# Patient Record
(demographics unavailable — no encounter records)

---

## 2021-06-21 NOTE — EDM.PDOC
ED HPI GENERAL MEDICAL PROBLEM





- General


Chief Complaint: Fever


Stated Complaint: chills


Time Seen by Provider: 06/21/21 19:23


Source of Information: Reports: Patient


History Limitations: Reports: No Limitations





- History of Present Illness


INITIAL COMMENTS - FREE TEXT/NARRATIVE: 





47-year-old male presents the emergency department with 4-day history of fever, 

body aches, joint aches, headache and nausea.  Patient states he recently 

returned from visiting [facility] from May 17 through June 4.  He did not have any 

vaccinations prior to travel.  States that about 4 days ago he started having 

chills and body aches which has progressed into a severe headache and nausea and

mild sore throat.  He denies any cough or shortness of breath.  He denies any 

upper respiratory type symptoms.  He states his appetite has been "on and off ".

 He is still taking p.o. fluids appropriately.  He still can taste and smell his

food.  He states he had his Covid vaccination in February.  He had been [place] 

vaccine.  The patient is otherwise healthy.  He does not take any prescription 

medications and he does not have a primary care provider.  Of note, the patient 

has had malaria approximately 15 years ago and he states that his symptoms that 

he is having at this time feel quite similar to when he had malaria in the past.





- Related Data


                                    Allergies











Allergy/AdvReac Type Severity Reaction Status Date / Time


 


No Known Allergies Allergy   Verified 06/21/21 19:27











Home Meds: 


                                    Home Meds





. [No Known Home Meds]  06/21/21 [History]











Social & Family History





- Tobacco Use


Tobacco Use Status *Q: Never Tobacco User


Second Hand Smoke Exposure: No





- Recreational Drug Use


Recreational Drug Use: No





ED ROS GENERAL





- Review of Systems


Review Of Systems: See Below


Constitutional: Reports: Fever, Chills, Malaise, Decreased Appetite


HEENT: Reports: Throat Pain


Respiratory: Reports: No Symptoms


Cardiovascular: Reports: No Symptoms


Endocrine: Reports: No Symptoms


GI/Abdominal: Reports: Decreased Appetite, Nausea.  Denies: Abdominal Pain, 

Constipation, Diarrhea, Vomiting


: Reports: No Symptoms


Musculoskeletal: Reports: Joint Pain (generalized), Other (generalized body 

aches)


Skin: Reports: No Symptoms


Neurological: Reports: Headache


Psychiatric: Reports: No Symptoms


Hematologic/Lymphatic: Reports: No Symptoms


Immunologic: Reports: No Symptoms





ED EXAM, GENERAL





- Physical Exam


Exam: See Below


Exam Limited By: No Limitations


General Appearance: Alert, WD/WN, Moderate Distress


Eye Exam: Bilateral Eye: EOMI


Ears: Normal External Exam, Hearing Grossly Normal


Nose: Normal Inspection


Throat/Mouth: Normal Inspection, Normal Lips, Normal Voice, No Airway Compromise


Head: Atraumatic, Normocephalic


Neck: Normal Inspection, Supple, Non-Tender, Full Range of Motion


Respiratory/Chest: No Respiratory Distress, Lungs Clear, Normal Breath Sounds, 

No Accessory Muscle Use, Chest Non-Tender


Cardiovascular: Normal Peripheral Pulses, Regular Rate, Rhythm, No Murmur


Peripheral Pulses: 2+: Radial (L), Radial (R)


GI/Abdominal: Normal Bowel Sounds, Soft, Non-Tender, No Distention


 (Male) Exam: Deferred


Rectal (Males) Exam: Deferred


Back Exam: Normal Inspection


Extremities: Normal Inspection, Normal Range of Motion, Non-Tender, No Pedal 

Edema, Normal Capillary Refill


Neurological: Alert, Oriented, Normal Cognition


Psychiatric: Normal Affect, Normal Mood


Skin Exam: Warm, Dry, Intact, Normal Color, No Rash


Lymphatic: No Adenopathy





Course





- Vital Signs


Text/Narrative:: 





Upon assessment, the patient complains of a moderate headache.  He states he 

took Tylenol earlier today.  I have ordered for him to receive ibuprofen 600 mg.

  He is alert and oriented x3.  He does not have any blurred vision or double 

vision.  States he has generalized body aches and chills.  His lungs are clear 

to auscultation and he denies any abdominal pain or discomfort.  He denies any 

urinary symptoms.  I have ordered labs to include a CBC, CMP, C-reactive 

protein, blood cultures x2, lactic acid level with reflex, strep a swab, Covid 

swab, and portable chest x-ray.  I have also ordered UA with micro and culture 

if indicated.  I have contacted infectious diseases from [facility].  The [facility]

 call nurse was going to get a hold of the infectious disease physician who is 

on-call and to call me back.  I am most concerned that the patient could have 

contracted malaria while he was there as he did not have any vaccinations prior 

to travel.


Last Recorded V/S: 


                                Last Vital Signs











Temp  99 F   06/21/21 19:22


 


Pulse  101 H  06/21/21 19:22


 


Resp  18   06/21/21 19:22


 


BP  123/74   06/21/21 19:22


 


Pulse Ox  95   06/21/21 19:22














- Orders/Labs/Meds


Orders: 


                               Active Orders 24 hr











 Category Date Time Status


 


 CULTURE BLOOD [BC] Stat Lab  06/21/21 20:00 Received


 


 CULTURE BLOOD [BC] Stat Lab  06/21/21 20:06 Received


 


 CULTURE URINE [MREF] Stat Lab  06/21/21 20:28 Received


 


 Sodium Chloride 0.9% [Normal Saline] 1,000 ml Med  06/21/21 21:00 Active





 IV ASDIRECTED   


 


 Blood Culture x2 Reflex Set [OM.PC] Stat Oth  06/21/21 19:42 Ordered








                                Medication Orders





Sodium Chloride (Normal Saline)  1,000 mls @ 100 mls/hr IV ASDIRECTED ANGELIQUE


   Last Admin: 06/21/21 21:35  Dose: 100 mls/hr


   Documented by: DOMINIC








Labs: 


                                Laboratory Tests











  06/21/21 06/21/21 06/21/21 Range/Units





  19:35 20:00 20:00 


 


WBC   7.86   (4.23-9.07)  K/mm3


 


RBC   5.04   (4.63-6.08)  M/mm3


 


Hgb   14.4   (13.7-17.5)  gm/dl


 


Hct   42.1   (40.1-51.0)  %


 


MCV   83.5   (79.0-92.2)  fl


 


MCH   28.6   (25.7-32.2)  pg


 


MCHC   34.2   (32.2-35.5)  g/dl


 


RDW Std Deviation   39.8   (35.1-43.9)  fL


 


Plt Count   76 L   (163-337)  K/mm3


 


MPV   12.3   (9.4-12.3)  fl


 


Neut % (Auto)   87.3 H   (34.0-67.9)  %


 


Lymph % (Auto)   6.0 L   (21.8-53.1)  %


 


Mono % (Auto)   6.1   (5.3-12.2)  %


 


Eos % (Auto)   0.1 L   (0.8-7.0)  


 


Baso % (Auto)   0.1   (0.1-1.2)  %


 


Neut # (Auto)   6.86 H   (1.78-5.38)  K/mm3


 


Lymph # (Auto)   0.47 L   (1.32-3.57)  K/mm3


 


Mono # (Auto)   0.48   (0.30-0.82)  K/mm3


 


Eos # (Auto)   0.01 L   (0.04-0.54)  K/mm3


 


Baso # (Auto)   0.01   (0.01-0.08)  K/mm3


 


Manual Slide Review   Abnormal smear   


 


Sodium    133 L  (136-145)  mEq/L


 


Potassium    3.6  (3.5-5.1)  mEq/L


 


Chloride    98  ()  mEq/L


 


Carbon Dioxide    24  (21-32)  mEq/L


 


Anion Gap    14.6  (5-15)  


 


BUN    20 H  (7-18)  mg/dL


 


Creatinine    1.2  (0.7-1.3)  mg/dL


 


Est Cr Clr Drug Dosing    81.05  mL/min


 


Estimated GFR (MDRD)    > 60  (>60)  mL/min


 


BUN/Creatinine Ratio    16.7  (14-18)  


 


Glucose    111 H  (70-99)  mg/dL


 


Lactic Acid     (0.4-2.0)  mmol/L


 


Calcium    8.3 L  (8.5-10.1)  mg/dL


 


Total Bilirubin    7.7 H  (0.2-1.0)  mg/dL


 


AST    91 H  (15-37)  U/L


 


ALT    59  (16-63)  U/L


 


Alkaline Phosphatase    122 H  ()  U/L


 


C-Reactive Protein    21.0 H*  (<1.0)  mg/dL


 


Total Protein    7.0  (6.4-8.2)  g/dl


 


Albumin    3.2 L  (3.4-5.0)  g/dl


 


Globulin    3.8  gm/dL


 


Albumin/Globulin Ratio    0.8 L  (1-2)  


 


Urine Color     (Yellow)  


 


Urine Appearance     (Clear)  


 


Urine pH     (5.0-8.0)  


 


Ur Specific Gravity     (1.005-1.030)  


 


Urine Protein     (Negative)  


 


Urine Glucose (UA)     (Negative)  


 


Urine Ketones     (Negative)  


 


Urine Occult Blood     (Negative)  


 


Urine Nitrite     (Negative)  


 


Urine Bilirubin     (Negative)  


 


Urine Urobilinogen     (0.2-1.0)  


 


Ur Leukocyte Esterase     (Negative)  


 


Urine RBC     (0-5)  /hpf


 


Urine WBC     (0-5)  /hpf


 


Ur Epithelial Cells     (0-5)  /hpf


 


Urine Bacteria     (FEW)  /hpf


 


Urine Mucus     (FEW)  /hpf


 


SARS-CoV-2 RNA ([facility])     (NEGATIVE)  


 


Group A Strep (PCR)  Not detected    (NOT DETECT)  














  06/21/21 06/21/21 06/21/21 Range/Units





  20:06 20:20 20:28 


 


WBC     (4.23-9.07)  K/mm3


 


RBC     (4.63-6.08)  M/mm3


 


Hgb     (13.7-17.5)  gm/dl


 


Hct     (40.1-51.0)  %


 


MCV     (79.0-92.2)  fl


 


MCH     (25.7-32.2)  pg


 


MCHC     (32.2-35.5)  g/dl


 


RDW Std Deviation     (35.1-43.9)  fL


 


Plt Count     (163-337)  K/mm3


 


MPV     (9.4-12.3)  fl


 


Neut % (Auto)     (34.0-67.9)  %


 


Lymph % (Auto)     (21.8-53.1)  %


 


Mono % (Auto)     (5.3-12.2)  %


 


Eos % (Auto)     (0.8-7.0)  


 


Baso % (Auto)     (0.1-1.2)  %


 


Neut # (Auto)     (1.78-5.38)  K/mm3


 


Lymph # (Auto)     (1.32-3.57)  K/mm3


 


Mono # (Auto)     (0.30-0.82)  K/mm3


 


Eos # (Auto)     (0.04-0.54)  K/mm3


 


Baso # (Auto)     (0.01-0.08)  K/mm3


 


Manual Slide Review     


 


Sodium     (136-145)  mEq/L


 


Potassium     (3.5-5.1)  mEq/L


 


Chloride     ()  mEq/L


 


Carbon Dioxide     (21-32)  mEq/L


 


Anion Gap     (5-15)  


 


BUN     (7-18)  mg/dL


 


Creatinine     (0.7-1.3)  mg/dL


 


Est Cr Clr Drug Dosing     mL/min


 


Estimated GFR (MDRD)     (>60)  mL/min


 


BUN/Creatinine Ratio     (14-18)  


 


Glucose     (70-99)  mg/dL


 


Lactic Acid  0.8    (0.4-2.0)  mmol/L


 


Calcium     (8.5-10.1)  mg/dL


 


Total Bilirubin     (0.2-1.0)  mg/dL


 


AST     (15-37)  U/L


 


ALT     (16-63)  U/L


 


Alkaline Phosphatase     ()  U/L


 


C-Reactive Protein     (<1.0)  mg/dL


 


Total Protein     (6.4-8.2)  g/dl


 


Albumin     (3.4-5.0)  g/dl


 


Globulin     gm/dL


 


Albumin/Globulin Ratio     (1-2)  


 


Urine Color    Dionne H  (Yellow)  


 


Urine Appearance    Clear  (Clear)  


 


Urine pH    6.5  (5.0-8.0)  


 


Ur Specific Gravity    1.020  (1.005-1.030)  


 


Urine Protein    3+ H  (Negative)  


 


Urine Glucose (UA)    Trace H  (Negative)  


 


Urine Ketones    Trace H  (Negative)  


 


Urine Occult Blood    1+ H  (Negative)  


 


Urine Nitrite    Positive H  (Negative)  


 


Urine Bilirubin    2+ H  (Negative)  


 


Urine Urobilinogen    >=8.0 H  (0.2-1.0)  


 


Ur Leukocyte Esterase    Negative  (Negative)  


 


Urine RBC    0-5  (0-5)  /hpf


 


Urine WBC    5-10 H  (0-5)  /hpf


 


Ur Epithelial Cells    0-5  (0-5)  /hpf


 


Urine Bacteria    Many H  (FEW)  /hpf


 


Urine Mucus    Rare  (FEW)  /hpf


 


SARS-CoV-2 RNA ([facility])   Negative   (NEGATIVE)  


 


Group A Strep (PCR)     (NOT DETECT)  











Meds: 


Medications











Generic Name Dose Route Start Last Admin





  Trade Name Freq  PRN Reason Stop Dose Admin


 


Sodium Chloride  1,000 mls @ 100 mls/hr  06/21/21 21:00  06/21/21 21:35





  Normal Saline  IV   100 mls/hr





  ASDIRECTED ANGELIQUE   Administration














Discontinued Medications














Generic Name Dose Route Start Last Admin





  Trade Name Freq  PRN Reason Stop Dose Admin


 


Ibuprofen  600 mg  06/21/21 20:02  06/21/21 20:23





  Ibuprofen 600 Mg Tab  PO  06/21/21 20:03  600 mg





  ONETIME ONE   Administration














- Re-Assessments/Exams


Free Text/Narrative Re-Assessment/Exam: 





06/21/21 20:23


Spoke with Dr. Hill, infectious disease specialist at [facility] in 

[place] and she recommends that the patient have a malaria smear completed or 

at least an antigen test.  We do not have that rapid test available at our 

facility.  So I feel this patient does need to be transferred to a facility 

capable for testing and treating possibility of him having malaria.  I did speak

 with the emergency room physician Dr. Mason, at [facility] and he 

agreed to accept the patient in transport.  I have ordered for the patient to 

receive normal saline at 100 mL's per hour and he will be transported via 

ambulance.  The patient was awaiting aware of this and he has agreed.





06/21/21 21:55


Hematology reveals a WBC of 7.86, hemoglobin 14.4, hematocrit 42.1, platelet 

count 76 manual slide is pending, urinalysis reveals 3+ protein, trace glucose, 

trace of ketones, 1+ occult blood, nitrite positive, 2+ bilirubin, greater than 

or equal to 8.0 urobilinogen, leukocyte Estrace is negative, micro is pending.





Serology reveals group A strep is not detected.





Covid swab and CMP is pending.


06/21/21 21:55


Radiologist impression portable view of the chest: 1.  Heart is enlarged.  

Please correlate if etiology is clinically known.  2.  Nothing acute is 

otherwise seen on portable chest x-ray.





Ambulance crew is here to transport the patient to [facility] in [place].


06/21/21 21:58


Lab was here to draw malaria testing which we normally do send to [place] to be

 read.  They are going to send the slides along with the ambulance crew to 

[place] with the patient.


06/21/21 22:29


Chemistry reveals a sodium of 133, potassium 3.6, anion gap 14.6, BUN 20, 

creatinine 1.2, glucose 111, lactic active 0.8, calcium 8.3, total bilirubin 

7.7, AST 91, ALT 59, alk phos 122, C-reactive protein 21.0





Covid is negative





Dr. Mason had requested that lab work be faxed to him.  I have advised our 

unit clerk to do this.





Departure





- Departure


Time of Disposition: 21:58


Disposition: DC/Tfer to [facility] 02


Condition: Fair


Clinical Impression: 


 Malaria screening





Headache


Qualifiers:


 Headache type: unspecified Headache chronicity pattern: acute headache 

Intractability: not intractable Qualified Code(s): R51.9 - Headache, unspecified








- Discharge Information


Referrals: 


PCP,None [Primary Care Provider] - 


Forms:  ED Department Discharge





Sepsis Event Note (ED)





- Evaluation


Sepsis Screening Result: No Definite Risk





- Focused Exam


Vital Signs: 


                                   Vital Signs











  Temp Pulse Resp BP Pulse Ox


 


 06/21/21 19:22  99 F  101 H  18  123/74  95














- My Orders


Last 24 Hours: 


My Active Orders





06/21/21 19:42


Blood Culture x2 Reflex Set [OM.PC] Stat 





06/21/21 20:00


CULTURE BLOOD [BC] Stat 





06/21/21 20:06


CULTURE BLOOD [BC] Stat 





06/21/21 20:28


CULTURE URINE [MREF] Stat 





06/21/21 21:00


Sodium Chloride 0.9% [Normal Saline] 1,000 ml IV ASDIRECTED 














- Assessment/Plan


Last 24 Hours: 


My Active Orders





06/21/21 19:42


Blood Culture x2 Reflex Set [OM.PC] Stat 





06/21/21 20:00


CULTURE BLOOD [BC] Stat 





06/21/21 20:06


CULTURE BLOOD [BC] Stat 





06/21/21 20:28


CULTURE URINE [MREF] Stat 





06/21/21 21:00


Sodium Chloride 0.9% [Normal Saline] 1,000 ml IV ASDIRECTED

## 2021-06-21 NOTE — CR
Chest: Portable view of the chest was obtained.

 

Comparison: No prior chest imaging is available.

 

Heart is enlarged.  Upper mediastinum is within normal limits.  Lungs 

are clear with no acute parenchymal change.  No acute osseous 

abnormality is appreciated.

 

Impression:

1.  Heart is enlarged.  Please correlate if etiology is clinically 

known.

2.  Nothing acute is otherwise seen on portable chest x-ray.

 

Diagnostic code #2